# Patient Record
Sex: FEMALE | Race: WHITE | NOT HISPANIC OR LATINO | Employment: UNEMPLOYED | URBAN - METROPOLITAN AREA
[De-identification: names, ages, dates, MRNs, and addresses within clinical notes are randomized per-mention and may not be internally consistent; named-entity substitution may affect disease eponyms.]

---

## 2020-11-26 ENCOUNTER — HOSPITAL ENCOUNTER (EMERGENCY)
Facility: HOSPITAL | Age: 40
Discharge: HOME | End: 2020-11-26
Attending: FAMILY MEDICINE
Payer: MEDICAID

## 2020-11-26 VITALS
RESPIRATION RATE: 18 BRPM | HEIGHT: 59 IN | OXYGEN SATURATION: 99 % | HEART RATE: 104 BPM | BODY MASS INDEX: 32.86 KG/M2 | DIASTOLIC BLOOD PRESSURE: 80 MMHG | WEIGHT: 163 LBS | TEMPERATURE: 98 F | SYSTOLIC BLOOD PRESSURE: 111 MMHG

## 2020-11-26 DIAGNOSIS — N30.01 ACUTE CYSTITIS WITH HEMATURIA: Primary | ICD-10-CM

## 2020-11-26 LAB
BILIRUB UR QL STRIP: ABNORMAL
CLARITY UR: CLEAR
COLOR UR: YELLOW
GLUCOSE UR QL STRIP: NEGATIVE
HGB UR QL STRIP: NEGATIVE
KETONES UR QL STRIP: ABNORMAL
LEUKOCYTE ESTERASE UR QL STRIP: ABNORMAL
MICROSCOPIC COMMENT: ABNORMAL
NITRITE UR QL STRIP: NEGATIVE
PH UR STRIP: 7 [PH] (ref 5–8)
PROT UR QL STRIP: ABNORMAL
RBC #/AREA URNS HPF: 3 /HPF (ref 0–4)
SP GR UR STRIP: 1.02 (ref 1–1.03)
SQUAMOUS #/AREA URNS HPF: 20 /HPF
URN SPEC COLLECT METH UR: ABNORMAL
UROBILINOGEN UR STRIP-ACNC: NEGATIVE EU/DL
WBC #/AREA URNS HPF: 8 /HPF (ref 0–5)
YEAST URNS QL MICRO: ABNORMAL

## 2020-11-26 PROCEDURE — 81000 URINALYSIS NONAUTO W/SCOPE: CPT

## 2020-11-26 PROCEDURE — 99283 EMERGENCY DEPT VISIT LOW MDM: CPT

## 2020-11-26 RX ORDER — CEPHALEXIN 250 MG/1
250 CAPSULE ORAL 4 TIMES DAILY
Qty: 28 CAPSULE | Refills: 0 | Status: SHIPPED | OUTPATIENT
Start: 2020-11-26 | End: 2020-12-03

## 2020-11-26 RX ORDER — CEPHALEXIN 250 MG/1
500 CAPSULE ORAL
Status: DISCONTINUED | OUTPATIENT
Start: 2020-11-26 | End: 2020-11-26

## 2020-11-26 NOTE — ED PROVIDER NOTES
Encounter Date: 11/26/2020       History     Chief Complaint   Patient presents with    Abdominal Pain     X 2 days; HX UC and pylori     39-year-old female presents to the ED ambulatory complaining of pain to her right flank area ulcer suprapubic area I do not have an appendix anymore patient does have history ulcer colitis she has had some dysuria and polyuria but no fever chills or headache she has no known exposure to COVID-19 and denies any heavy lifting or back sprain        Review of patient's allergies indicates:   Allergen Reactions    Penicillins      Past Medical History:   Diagnosis Date    Ulcerative colitis      Past Surgical History:   Procedure Laterality Date    APPENDECTOMY       History reviewed. No pertinent family history.  Social History     Tobacco Use    Smoking status: Current Every Day Smoker     Packs/day: 0.50    Smokeless tobacco: Never Used   Substance Use Topics    Alcohol use: Never     Frequency: Never    Drug use: Never     Review of Systems   Constitutional: Negative for fever.   HENT: Negative for sore throat.    Respiratory: Negative for shortness of breath.    Cardiovascular: Negative for chest pain.   Gastrointestinal: Negative for nausea.   Genitourinary: Negative for dysuria.   Musculoskeletal: Negative for back pain.   Skin: Negative for rash.   Neurological: Negative for weakness.   Hematological: Does not bruise/bleed easily.       Physical Exam     Initial Vitals [11/26/20 0734]   BP Pulse Resp Temp SpO2   111/80 104 18 98 °F (36.7 °C) 99 %      MAP       --         Physical Exam    Nursing note and vitals reviewed.  Constitutional: She appears well-developed and well-nourished. She is not diaphoretic. No distress.   HENT:   Head: Normocephalic and atraumatic.   Right Ear: External ear normal.   Left Ear: External ear normal.   Eyes: Pupils are equal, round, and reactive to light. Right eye exhibits no discharge. Left eye exhibits no discharge.   Neck: No  tracheal deviation present. No JVD present.   Cardiovascular: Exam reveals no friction rub.    No murmur heard.  Pulmonary/Chest: No stridor. No respiratory distress. She has no wheezes. She has no rales.   Abdominal: Bowel sounds are normal. She exhibits no distension.   Musculoskeletal: Normal range of motion.   Neurological: She is alert.   Skin: Skin is warm.   Psychiatric: She has a normal mood and affect.         ED Course   Procedures  Labs Reviewed   URINALYSIS, REFLEX TO URINE CULTURE - Abnormal; Notable for the following components:       Result Value    Protein, UA Trace (*)     Ketones, UA Trace (*)     Bilirubin (UA) 1+ (*)     Leukocytes, UA Trace (*)     All other components within normal limits    Narrative:     Specimen Source->Urine   URINALYSIS MICROSCOPIC - Abnormal; Notable for the following components:    WBC, UA 8 (*)     Yeast, UA Few (*)     All other components within normal limits    Narrative:     Specimen Source->Urine          Imaging Results    None                                      Clinical Impression:       ICD-10-CM ICD-9-CM   1. Acute cystitis with hematuria  N30.01 595.0                          ED Disposition Condition    Discharge Stable        ED Prescriptions     Medication Sig Dispense Start Date End Date Auth. Provider    cephALEXin (KEFLEX) 250 MG capsule Take 1 capsule (250 mg total) by mouth 4 (four) times daily. for 7 days 28 capsule 11/26/2020 12/3/2020 Nickolas Mahoney MD        Follow-up Information    None                                      Nickolas Mahoney MD  11/27/20 8883

## 2020-11-26 NOTE — Clinical Note
"Kanika Gonzalez" Flex was seen and treated in our emergency department on 11/26/2020.  She may return to work on 11/27/2020.       If you have any questions or concerns, please don't hesitate to call.      Renu AREVALO RN RN    "

## 2020-12-20 ENCOUNTER — HOSPITAL ENCOUNTER (EMERGENCY)
Facility: HOSPITAL | Age: 40
Discharge: HOME | End: 2020-12-20
Payer: MEDICAID

## 2020-12-20 VITALS
HEART RATE: 92 BPM | HEIGHT: 59 IN | OXYGEN SATURATION: 100 % | RESPIRATION RATE: 20 BRPM | TEMPERATURE: 98 F | DIASTOLIC BLOOD PRESSURE: 86 MMHG | SYSTOLIC BLOOD PRESSURE: 107 MMHG | BODY MASS INDEX: 33.87 KG/M2 | WEIGHT: 168 LBS

## 2020-12-20 DIAGNOSIS — Z20.822 COVID-19 VIRUS NOT DETECTED: Primary | ICD-10-CM

## 2020-12-20 LAB — SARS-COV-2 RDRP RESP QL NAA+PROBE: NEGATIVE

## 2020-12-20 PROCEDURE — 99283 EMERGENCY DEPT VISIT LOW MDM: CPT

## 2020-12-20 PROCEDURE — U0002 COVID-19 LAB TEST NON-CDC: HCPCS

## 2020-12-20 RX ORDER — ONDANSETRON 4 MG/1
4 TABLET, ORALLY DISINTEGRATING ORAL EVERY 6 HOURS PRN
Qty: 8 TABLET | Refills: 0 | Status: SHIPPED | OUTPATIENT
Start: 2020-12-20 | End: 2020-12-22

## 2020-12-20 NOTE — DISCHARGE INSTRUCTIONS
Take over-the-counter medications as needed for symptoms.  The COVID swab performed today is NEGATIVE.  Please continue to self isolate and follow the CDC guidelines.  Wear a mask, wash hands frequently, and maintain 6 ft of distance from others.  Return for any worsening or new symptoms. Follow up with Primary Care Provider as needed.    COVID-19 TESTING  Hot Line [phone]  [address] [place] [address], [place] [address]  [facility]  Hours: 8am-5pm Monday - Friday   8am-noon Saturday - Sunday

## 2020-12-20 NOTE — ED PROVIDER NOTES
Please note that my documentation in this Electronic Healthcare Record was produced using speech recognition software and therefore may contain errors related to that software.These could include grammar, punctuation and spelling errors or the inclusion/ exclusion of phrases that were not intended. Please contact myself for any clarification, questions or concerns.    HPI: Patient is a 40 y.o. female who presents with the chief complaint of Covid concerns. Complaining of loss of taste x 1 day. Also having body aches, nausea, vomiting, diarrhea, cough, congestion, sore throat x 1 week. Uses tobacco. Has been exposed to covid.  Denies any chest pain, shortness of breath, abdominal pain, lightheadedness, dizziness, extremity weakness or paresthesias.  History of ulcerative colitis.  Not take any medication regularly.  History of appendectomy.    REVIEW OF SYSTEMS - 10 systems were independently reviewed and are otherwise negative with the exception of those items previously documented in the HPI and nursing notes.    Allergy: Penicillins    Past medical history:   Past Medical History:   Diagnosis Date    Ulcerative colitis        Surgical History:   Past Surgical History:   Procedure Laterality Date    APPENDECTOMY         Social history:   Social History     Socioeconomic History    Marital status: Single     Spouse name: Not on file    Number of children: Not on file    Years of education: Not on file    Highest education level: Not on file   Occupational History    Not on file   Social Needs    Financial resource strain: Not on file    Food insecurity     Worry: Not on file     Inability: Not on file    Transportation needs     Medical: Not on file     Non-medical: Not on file   Tobacco Use    Smoking status: Current Every Day Smoker     Packs/day: 0.50    Smokeless tobacco: Never Used   Substance and Sexual Activity    Alcohol use: Never     Frequency: Never    Drug use: Never    Sexual activity:  Yes     Partners: Male   Lifestyle    Physical activity     Days per week: Not on file     Minutes per session: Not on file    Stress: Not on file   Relationships    Social connections     Talks on phone: Not on file     Gets together: Not on file     Attends Orthodoxy service: Not on file     Active member of club or organization: Not on file     Attends meetings of clubs or organizations: Not on file     Relationship status: Not on file   Other Topics Concern    Not on file   Social History Narrative    Not on file       Family history: non-contributory    EHR: reviewed    Vitals: There were no vitals taken for this visit.    PHYSICAL EXAM:    General-40-year-old female awake and alert, oriented, GCS 15, in no acute distress,  HEENT- normocephalic, atraumatic, sclera anicteric, moist mucous membranes, PERRL, EOMI, bilateral TM's intact w/out erythema, effusion, or bulging. No oropharyngeal erythema, tonsillar enlargement, or exudates. Bilateral turbinates enlarged and pink.   CARDIOVASCULAR- regular rate and rhythm, no murmurs/rubs,/gallops, normal S1-S2  PULMONARY- nonlabored, no respiratory distress, clear to auscultation bilaterally, no wheezes/rhonchi/rales, chest expansion symmetrical  GASTROINTESTINAL- soft, nontender, nondistended  NEUROLOGIC- mental status normal, speech fluid, cognition normal, CN II-XII grossly intact, ambulatory with proper gait.  MUSCULOSKELETAL- well-nourished, well-developed  DERMATOLOGIC- warm and dry, no visible rashes  PSYCHIATRIC- normal affect, normal concentration           Labs Reviewed   SARS-COV-2 RNA AMPLIFICATION, QUAL       No orders to display       MEDICAL DECISION MAKING: Patient is a 40 y.o. female who presented with chief complaint of Covid concerns.  Patient complaining of viral-like symptoms.  Denies any chest pain or shortness of breath.  Vitals are stable normal.  COVID swab was negative.    The patient was advised to return immediately to the Emergency  Department for any difficulty breathing, confusion, dizziness or passing out, vomiting, chest pain, high fevers, weakness, or any other new or concerning symptoms. There is no evidence of emergent medical condition at this time, and I do believe the patient can be safety discharged. The patient comprehends and agrees with the discharge plan outlined above and seems reliable, and is being discharged improved and in good condition after having been observed here.     CLINICAL IMPRESSION:  1. COVID-19 virus not detected         EZIO Fuller  12/20/20 [id number]

## 2020-12-20 NOTE — Clinical Note
"Kanika"Coco Mccord was seen and treated in our emergency department on 12/20/2020.     COVID-19 is present in our communities across the state. There is limited testing for COVID at this time, so not all patients can be tested. In this situation, your employee meets the following criteria:    Kanika Mccord has met the criteria for COVID-19 testing and has a NEGATIVE result. The employee can return to work once they are asymptomatic for 72 hours without the use of fever reducing medications (Tylenol, Motrin, etc).     If you have any questions or concerns, or if I can be of further assistance, please do not hesitate to contact me.    Sincerely,             EZIO Fuller"